# Patient Record
Sex: MALE | Race: WHITE | NOT HISPANIC OR LATINO
[De-identification: names, ages, dates, MRNs, and addresses within clinical notes are randomized per-mention and may not be internally consistent; named-entity substitution may affect disease eponyms.]

---

## 2023-07-18 ENCOUNTER — APPOINTMENT (OUTPATIENT)
Dept: UROLOGY | Facility: CLINIC | Age: 68
End: 2023-07-18

## 2024-06-26 ENCOUNTER — APPOINTMENT (OUTPATIENT)
Dept: UROLOGY | Facility: CLINIC | Age: 69
End: 2024-06-26
Payer: MEDICARE

## 2024-06-26 VITALS
WEIGHT: 183 LBS | HEART RATE: 73 BPM | HEIGHT: 70.43 IN | BODY MASS INDEX: 25.91 KG/M2 | SYSTOLIC BLOOD PRESSURE: 130 MMHG | DIASTOLIC BLOOD PRESSURE: 89 MMHG | TEMPERATURE: 98.1 F | RESPIRATION RATE: 15 BRPM | OXYGEN SATURATION: 98 %

## 2024-06-26 DIAGNOSIS — Z12.5 ENCOUNTER FOR SCREENING FOR MALIGNANT NEOPLASM OF PROSTATE: ICD-10-CM

## 2024-06-26 DIAGNOSIS — R39.9 UNSPECIFIED SYMPTOMS AND SIGNS INVOLVING THE GENITOURINARY SYSTEM: ICD-10-CM

## 2024-06-26 PROBLEM — Z00.00 ENCOUNTER FOR PREVENTIVE HEALTH EXAMINATION: Status: ACTIVE | Noted: 2024-06-26

## 2024-06-26 PROCEDURE — G2211 COMPLEX E/M VISIT ADD ON: CPT

## 2024-06-26 PROCEDURE — 99204 OFFICE O/P NEW MOD 45 MIN: CPT

## 2024-06-26 RX ORDER — TAMSULOSIN HYDROCHLORIDE 0.4 MG/1
0.4 CAPSULE ORAL
Qty: 30 | Refills: 3 | Status: ACTIVE | COMMUNITY
Start: 2024-06-26 | End: 1900-01-01

## 2024-06-26 RX ORDER — ATORVASTATIN CALCIUM 20 MG/1
20 TABLET, FILM COATED ORAL
Refills: 0 | Status: ACTIVE | COMMUNITY

## 2024-06-26 RX ORDER — LOXAPINE 50 MG/1
CAPSULE ORAL
Refills: 0 | Status: ACTIVE | COMMUNITY

## 2024-06-26 RX ORDER — AMLODIPINE BESYLATE AND BENAZEPRIL HYDROCHLORIDE 2.5; 1 MG/1; MG/1
2.5-1 CAPSULE ORAL
Refills: 0 | Status: ACTIVE | COMMUNITY

## 2024-06-28 LAB — BACTERIA UR CULT: NORMAL

## 2024-09-06 ENCOUNTER — APPOINTMENT (OUTPATIENT)
Dept: UROLOGY | Facility: CLINIC | Age: 69
End: 2024-09-06
Payer: MEDICARE

## 2024-09-06 VITALS
WEIGHT: 183 LBS | TEMPERATURE: 97 F | DIASTOLIC BLOOD PRESSURE: 75 MMHG | RESPIRATION RATE: 16 BRPM | SYSTOLIC BLOOD PRESSURE: 118 MMHG | HEIGHT: 70.43 IN | OXYGEN SATURATION: 98 % | BODY MASS INDEX: 25.91 KG/M2 | HEART RATE: 77 BPM

## 2024-09-06 DIAGNOSIS — Z12.5 ENCOUNTER FOR SCREENING FOR MALIGNANT NEOPLASM OF PROSTATE: ICD-10-CM

## 2024-09-06 PROCEDURE — 99214 OFFICE O/P EST MOD 30 MIN: CPT

## 2024-09-06 PROCEDURE — G2211 COMPLEX E/M VISIT ADD ON: CPT

## 2024-09-12 NOTE — HISTORY OF PRESENT ILLNESS
[FreeTextEntry1] : DIPTI AWAD Jul 6 1955  Language: English Date of First visit: 06/26/2024 Accompanied by: self Contact info: Referring Provider/PCP: Dr. Warren Fax: [phone]  CC/ Problem List:  LUTs =============================================================================== FIRST VISIT / Summary: Very pleasant 68 year old M here for LUTs. Bothered by weak stream. Nocturia 1x. Has tried OTC medication prostagenics x1 month and seems to be helpful. Better flow and stream is stronger. Drinks 2 quarts daily of liquids.  IPSS 10 QOL 2  ------------------------------------------------------------------------------------------- INTERVAL VISITS: The patient's medications and allergies were reviewed and edited below. Dated 09/06/2024   LUTS follow-up. Taking medication on and off. He feels some mild skin reaction as a possible side effect. He thinks it does help flow when he takes it. Took it about 2 days ago.   ===============================================================================  PMH: HTN, HLD Meds: ASA 81 mg, combo amlodipine-lisinopril, Lipitor, OTC prostagenics All: denies FHx: no  malignancies SocHx: former smoker 1/4 ppdx20 years on and off quit 2023, occasional Etoh, , 1 child, retired  PSH: partial colectomy 2008  ROS: Review of Systems is as per HPI unless otherwise denoted below  =============================================================================== DATA: LABS (SELECTED):----------------------------------------------------------------------------------------------- 6/10/2024 UA leukocyte and nitrite negative, RBC negative, Cr 1.1, A1C 6, testosterone 478  RADS:-------------------------------------------------------------------------------------------------------------------   PATHOLOGY/CYTOLOGY:-------------------------------------------------------------------------------------------   VOIDING STUDIES: ---------------------------------------------------------------------------------------------- 06/26/2024 PVR 4 09/06/2024 Uroflow with peak of 13.2 and volume 147. PVR 2 (off alpha blocker for 2 days)  STONE STUDIES: (Analysis/LLSA)----------------------------------------------------------------------------------   PROCEDURES: -----------------------------------------------------------------------------------------------    =============================================================================== PHYSICAL EXAM:   FOCUSED: ---------------------------------------------------------------------------------------------------- declined  ======================================================================================= DISCUSSION: ======================================================================================= ASSESSMENT and PLAN  1. LUTs -UCx neg -continue tamsulosin -PSA 09/06/2024  -uroflow good - this was off tamsulosin -RTC in 1 year   ======================================================================================= The total time personally spent preparing for this visit (reviewing test results, obtaining external history) and during the visit (ordering tests/medications, spent face to face with the patient / family and counseling them on the above), as well as after the visit (on clinical documentation and coordination with other care providers) was approximately 35 minutes.   Thank you for allowing me to assist in the care of your patient. Should you have any questions please do not hesitate to reach out to me.   Jorge L Ceballos MD       Health system Physician [facility]  [place] Office: [address], [address] [place] [address] T: [phone] F: [phone]  [place] Office: [address], [address] [place] [address] T: [phone] F: [phone]

## 2024-11-06 ENCOUNTER — RX RENEWAL (OUTPATIENT)
Age: 69
End: 2024-11-06

## 2025-09-05 ENCOUNTER — APPOINTMENT (OUTPATIENT)
Dept: UROLOGY | Facility: CLINIC | Age: 70
End: 2025-09-05